# Patient Record
Sex: FEMALE
[De-identification: names, ages, dates, MRNs, and addresses within clinical notes are randomized per-mention and may not be internally consistent; named-entity substitution may affect disease eponyms.]

---

## 2019-02-28 ENCOUNTER — HOSPITAL ENCOUNTER (OUTPATIENT)
Dept: HOSPITAL 92 - SCSMAMMO | Age: 59
Discharge: HOME | End: 2019-02-28
Attending: FAMILY MEDICINE
Payer: COMMERCIAL

## 2019-02-28 DIAGNOSIS — Z12.31: Primary | ICD-10-CM

## 2019-02-28 PROCEDURE — 77067 SCR MAMMO BI INCL CAD: CPT

## 2019-02-28 NOTE — MMO
BILATERAL SCREENING MAMMOGRAM:

 

Date:  02/28/19 

 

INDICATION:

Annual exam. 

 

COMPARISON:  

Prior exam dated 11/14/17 and 07/22/15. 

 

FINDINGS:

 

Interpretation of this exam was assisted with computer-aided detection.  

 

The breast parenchyma is heterogeneously dense. 

 

No new suspicious mass, cluster of microcalcifications, or area of architectural distortion is eviden
t. 

 

IMPRESSION: 

 

BIRADS 1:  Negative

 

Recommend routine annual mammographic screening.  

 

POS: GABBY

## 2022-07-15 ENCOUNTER — HOSPITAL ENCOUNTER (OUTPATIENT)
Dept: HOSPITAL 92 - CSHULT | Age: 62
Discharge: HOME | End: 2022-07-15
Attending: PHYSICIAN ASSISTANT
Payer: COMMERCIAL

## 2022-07-15 DIAGNOSIS — K76.0: ICD-10-CM

## 2022-07-15 DIAGNOSIS — R10.12: Primary | ICD-10-CM

## 2022-07-15 PROCEDURE — 76700 US EXAM ABDOM COMPLETE: CPT

## 2022-07-19 ENCOUNTER — HOSPITAL ENCOUNTER (OUTPATIENT)
Dept: HOSPITAL 92 - CSHMAMMO | Age: 62
Discharge: HOME | End: 2022-07-19
Attending: PHYSICIAN ASSISTANT
Payer: COMMERCIAL

## 2022-07-19 DIAGNOSIS — Z78.0: ICD-10-CM

## 2022-07-19 DIAGNOSIS — M85.88: ICD-10-CM

## 2022-07-19 DIAGNOSIS — Z13.820: ICD-10-CM

## 2022-07-19 DIAGNOSIS — Z12.31: Primary | ICD-10-CM

## 2022-07-19 DIAGNOSIS — Z85.42: ICD-10-CM

## 2022-07-19 PROCEDURE — 77063 BREAST TOMOSYNTHESIS BI: CPT

## 2022-07-19 PROCEDURE — 77067 SCR MAMMO BI INCL CAD: CPT

## 2022-07-19 PROCEDURE — 77080 DXA BONE DENSITY AXIAL: CPT
